# Patient Record
Sex: MALE | Race: WHITE | NOT HISPANIC OR LATINO | Employment: STUDENT | URBAN - METROPOLITAN AREA
[De-identification: names, ages, dates, MRNs, and addresses within clinical notes are randomized per-mention and may not be internally consistent; named-entity substitution may affect disease eponyms.]

---

## 2021-06-28 ENCOUNTER — NEW PATIENT COMPREHENSIVE (OUTPATIENT)
Dept: URBAN - METROPOLITAN AREA CLINIC 38 | Facility: CLINIC | Age: 21
End: 2021-06-28

## 2021-06-28 DIAGNOSIS — H52.13: ICD-10-CM

## 2021-06-28 PROCEDURE — 92004 COMPRE OPH EXAM NEW PT 1/>: CPT

## 2021-06-28 PROCEDURE — 92015 DETERMINE REFRACTIVE STATE: CPT

## 2021-06-28 ASSESSMENT — KERATOMETRY
OS_AXISANGLE_DEGREES: 150
OS_AXISANGLE2_DEGREES: 60
OS_K2POWER_DIOPTERS: 43.50
OD_AXISANGLE2_DEGREES: 85
OD_K1POWER_DIOPTERS: 42.00
OS_K1POWER_DIOPTERS: 43.25
OD_AXISANGLE_DEGREES: 175
OD_K2POWER_DIOPTERS: 42.50

## 2021-06-28 ASSESSMENT — VISUAL ACUITY
OD_SC: 20/30
OS_SC: 20/30
OU_SC: 20/30

## 2021-06-28 ASSESSMENT — TONOMETRY
OD_IOP_MMHG: 18
OS_IOP_MMHG: 17

## 2022-05-16 NOTE — PATIENT DISCUSSION
5 16 22 STARTED 6 MONTHS AGO - HAS SEEN DR BERNARDO - HAS GLASSES THAT HELP - HAVING HEADACHES - RECOM EVAL WITH NEUROLOGIST.